# Patient Record
Sex: FEMALE | Race: BLACK OR AFRICAN AMERICAN
[De-identification: names, ages, dates, MRNs, and addresses within clinical notes are randomized per-mention and may not be internally consistent; named-entity substitution may affect disease eponyms.]

---

## 2023-03-02 ENCOUNTER — HOSPITAL ENCOUNTER (OUTPATIENT)
Facility: HOSPITAL | Age: 48
Setting detail: RECURRING SERIES
Discharge: HOME | End: 2023-03-05
Payer: COMMERCIAL

## 2023-03-02 PROCEDURE — 97535 SELF CARE MNGMENT TRAINING: CPT

## 2023-03-02 PROCEDURE — 97162 PT EVAL MOD COMPLEX 30 MIN: CPT
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
## 2023-03-02 NOTE — PROGRESS NOTES
PHYSICAL / OCCUPATIONAL THERAPY - DAILY TREATMENT NOTE (updated )  For Eval visit    Patient Name: Linwood Lassiter    Date: 3/2/2023    : 1975  Insurance: Payor: Valery Amato / Plan: Eli Lees / Product Type: *No Product type* /      Patient  verified yes     Visit #   Current / Total 1 24   Time   In / Out 12:00 12:43   Pain   In / Out 3 3   Subjective Functional Status/Changes: See POC   Changes to:  Meds, Allergies, Med Hx, Sx Hx? If yes, update Summary List no       TREATMENT AREA =  neck and back pain    OBJECTIVE       18 min   Eval - untimed                      Therapeutic Procedures: Tx Min Billable or 1:1 Min (if diff from Tx Min) Procedure, Rationale, Specifics   25  92861 Self Care/Home Management (timed):  improve patient knowledge and understanding of pain reducing techniques, positioning, posture/ergonomics, home safety, activity modification, diagnosis/prognosis, and physical therapy expectations, procedures and progression  to improve patient's ability to progress to PLOF and address remaining functional goals. (see flow sheet as applicable)     Details if applicable:  discussion about POC and rationale for exercise with benefits of increased O2; discussed goals and plans for getting into gym for independent care; discussed DA and need for physician signature after 30 days. 90391 Neuromuscular Re-Education (timed):  improve balance, coordination, kinesthetic sense, posture, core stability and proprioception to improve patient's ability to develop conscious control of individual muscles and awareness of position of extremities in order to progress to PLOF and address remaining functional goals. (see flow sheet as applicable)     Details if applicable:       46350 Therapeutic Exercise (timed):  increase ROM, strength, coordination, balance, and proprioception to improve patient's ability to progress to PLOF and address remaining functional goals.  (see flow sheet as applicable) Details if applicable:       69692 Therapeutic Activity (timed):  use of dynamic activities replicating functional movements to increase ROM, strength, coordination, balance, and proprioception in order to improve patient's ability to progress to PLOF and address remaining functional goals. (see flow sheet as applicable)     Details if applicable:     22  [facility] Totals Reminder: bill using total billable min of TIMED therapeutic procedures (example: do not include dry needle or estim unattended, both untimed codes, in totals to left)  8-22 min = 1 unit; 23-37 min = 2 units; 38-52 min = 3 units; 53-67 min = 4 units; 68-82 min = 5 units   Total Total     [x]  Patient Education billed concurrently with other procedures   [x] Review HEP    [] Progressed/Changed HEP, detail:    [] Other detail:       Objective Information/Functional Measures/Assessment    See POC    Patient will continue to benefit from skilled PT / OT services to modify and progress therapeutic interventions, analyze and address functional mobility deficits, analyze and address ROM deficits, analyze and address strength deficits, analyze and address soft tissue restrictions, analyze and cue for proper movement patterns, analyze and modify for postural abnormalities, and instruct in home and community integration to address functional deficits and attain remaining goals.     Progress toward goals / Updated goals:  [x]  See POC    See POC    PLAN  yes Continue plan of care  [x]  Upgrade activities as tolerated  []  Discharge due to :  []  Other:    Elisabeth Castellanos PT    3/2/2023    9:11 AM    Future Appointments   Date Time Provider Matthew Dash   3/2/2023 12:00 PM Elisabeth Castellanos, PT [address]

## 2023-03-06 ENCOUNTER — TELEPHONE (OUTPATIENT)
Facility: HOSPITAL | Age: 48
End: 2023-03-06

## 2023-03-08 ENCOUNTER — HOSPITAL ENCOUNTER (OUTPATIENT)
Facility: HOSPITAL | Age: 48
Setting detail: RECURRING SERIES
End: 2023-03-08
Payer: COMMERCIAL

## 2023-03-08 ENCOUNTER — TELEPHONE (OUTPATIENT)
Facility: HOSPITAL | Age: 48
End: 2023-03-08

## 2023-03-08 NOTE — TELEPHONE ENCOUNTER
<24hr cancel; patient states their daughter was exposed to Covid-19 and patient would like to cancel appt for today, stating she will get tested and call about keeping or not appt on 3/10/23.

## 2023-03-13 ENCOUNTER — HOSPITAL ENCOUNTER (OUTPATIENT)
Facility: HOSPITAL | Age: 48
Setting detail: RECURRING SERIES
Discharge: HOME | End: 2023-03-16
Payer: COMMERCIAL

## 2023-03-13 PROCEDURE — 97110 THERAPEUTIC EXERCISES: CPT

## 2023-03-13 PROCEDURE — 97140 MANUAL THERAPY 1/> REGIONS: CPT

## 2023-03-13 NOTE — PROGRESS NOTES
PHYSICAL / OCCUPATIONAL THERAPY - DAILY TREATMENT NOTE (updated )    Patient Name: Ward Poole    Date: 3/13/2023    : 1975  Insurance: Payor: LAURYN / Plan: Landy Cola / Product Type: *No Product type* /      Patient  verified Yes     Visit #   Current / Total 2 12   Time   In / Out 1035 1115   Pain   In / Out 4 2   Subjective Functional Status/Changes: Been going through a lot and I need to do some therapy for my body because i'm just in a lot of pain   Changes to:  Meds, Allergies, Med Hx, Sx Hx? If yes, update Summary List no       TREATMENT AREA =  Other low back pain [M54.59]    OBJECTIVE    Modalities Rationale:     decrease inflammation, decrease pain, and increase tissue extensibility to improve patient's ability to progress to PLOF and address remaining functional goals. min [] Estim Unattended, type/location:                                      []  w/ice    []  w/heat    min [] Estim Attended, type/location:                                     []  w/US     []  w/ice    []  w/heat    []  TENS insruct      min []  Mechanical Traction: type/lbs                   []  pro   []  sup   []  int   []  cont    []  before manual    []  after manual    min []  Ultrasound, settings/location:      min []  Iontophoresis w/ dexamethasone, location:                                               []  take home patch       []  in clinic    min  unbill []  Ice     []  Heat    location/position:     min []  Paraffin,  details:     min []  Vasopneumatic Device, press/temp:     min []  Elsie Hernandez / John Kang: If using vaso (only need to measure limb vaso being performed on)      pre-treatment girth :       post-treatment girth :       measured at (landmark location) :      min []  Other:    Skin assessment post-treatment (if applicable):    []  intact    []  redness- no adverse reaction                 []redness - adverse reaction:         Therapeutic Procedures:     Tx Min Billable or 1:1 Min (if diff from Tx Min) Procedure, Rationale, Specifics   15  28635 Manual Therapy (timed):  decrease pain, increase ROM, and increase tissue extensibility to improve patient's ability to progress to PLOF and address remaining functional goals. The manual therapy interventions were performed at a separate and distinct time from the therapeutic activities interventions . (see flow sheet as applicable)     Details if applicable:       25  48391 Therapeutic Exercise (timed):  increase ROM, strength, coordination, balance, and proprioception to improve patient's ability to progress to PLOF and address remaining functional goals. (see flow sheet as applicable)     Details if applicable:       72270 Neuromuscular Re-Education (timed):  improve balance, coordination, kinesthetic sense, posture, core stability and proprioception to improve patient's ability to develop conscious control of individual muscles and awareness of position of extremities in order to progress to PLOF and address remaining functional goals. (see flow sheet as applicable)     Details if applicable:       92254 Therapeutic Activity (timed):  use of dynamic activities replicating functional movements to increase ROM, strength, coordination, balance, and proprioception in order to improve patient's ability to progress to PLOF and address remaining functional goals.   (see flow sheet as applicable)     Details if applicable:            Details if applicable:     36  [facility] Totals Reminder: bill using total billable min of TIMED therapeutic procedures (example: do not include dry needle or estim unattended, both untimed codes, in totals to left)  8-22 min = 1 unit; 23-37 min = 2 units; 38-52 min = 3 units; 53-67 min = 4 units; 68-82 min = 5 units   Total Total     [x]  Patient Education billed concurrently with other procedures   [x] Review HEP    [] Progressed/Changed HEP, detail:    [] Other detail:       Objective Information/Functional Measures/Assessment    Overall good tolerance to all therapeutic interventions for first treatment session. No change in functional measurements today. Discussed treatment sessions to progress one body part per session for manual.  Performed all C/S this session and will address L/S next session. Patient will continue to benefit from skilled PT / OT services to modify and progress therapeutic interventions, analyze and address functional mobility deficits, analyze and address ROM deficits, analyze and address strength deficits, analyze and address soft tissue restrictions, and analyze and cue for proper movement patterns to address functional deficits and attain remaining goals. Progress toward goals / Updated goals:  []  See Progress Note/Recertification    Short Term Goals: To be accomplished in 3 weeks  Independent with HEP to progress to meet goals. 2. Pt to report 25% improvement in overall symptoms for improvement in ADLs. Long Term Goals: To be accomplished in 6 weeks  Improve FOTO score to 50/100 to show a significant functional change. 2. Pt to report decreased max pain levels less than 7/10 for improvement in QoL. 3. Pt to report going to the gym to perform routine for continued mental and physical care.     PLAN  Yes  Continue plan of care  [x]  Upgrade activities as tolerated  []  Discharge due to :  []  Other:    Lazara Ferris PTA    3/13/2023    6:39 AM    Future Appointments   Date Time Provider Matthew Dash   3/13/2023 10:30 AM Lazara Ferris PTA [facility]   3/15/2023  2:00 PM Lary Kay PT [facility]   3/17/2023 10:30 AM Lary Kay PT [facility]   3/22/2023 11:00 AM Lazara Ferris PTA [facility]   3/24/2023 11:00 AM Lary Kay, [address]   3/27/2023  9:30 AM Lazara Ferris PTA [facility]   3/29/2023  9:30 AM Lazara Ferris PTA [facility]   3/31/2023  9:30 AM Lazara Ferris PTA [facility]   4/3/2023  9:30 AM Lazara Ferris [facility]   4/5/2023  9:30 AM Erlinda Gonzalez KRISHAN Corley [facility]   4/10/2023  9:30 AM John Corley PTA [facility]   4/12/2023  9:30 AM KRISHAN Gutierrez [facility]

## 2023-03-15 ENCOUNTER — HOSPITAL ENCOUNTER (OUTPATIENT)
Facility: HOSPITAL | Age: 48
Setting detail: RECURRING SERIES
Discharge: HOME | End: 2023-03-18
Payer: COMMERCIAL

## 2023-03-15 PROCEDURE — 97110 THERAPEUTIC EXERCISES: CPT

## 2023-03-15 PROCEDURE — 97530 THERAPEUTIC ACTIVITIES: CPT

## 2023-03-15 PROCEDURE — G0283 ELEC STIM OTHER THAN WOUND: HCPCS

## 2023-03-15 NOTE — PROGRESS NOTES
PHYSICAL / OCCUPATIONAL THERAPY - DAILY TREATMENT NOTE (updated )    Patient Name: Karri Livingston    Date: 3/15/2023    : 1975  Insurance: Payor: LAURYN / Plan: [facility] [employer] / Product Type: *No Product type* /      Patient  verified Yes     Visit #   Current / Total 3 12   Time   In / Out 2:03 2:52   Pain   In / Out 3 1   Subjective Functional Status/Changes: Reports she was a little sore after last session but felt pretty good. Changes to:  Meds, Allergies, Med Hx, Sx Hx? If yes, update Summary List no       TREATMENT AREA =  Other low back pain [M54.59]    OBJECTIVE    Modalities Rationale:     decrease inflammation, decrease pain, and increase tissue extensibility to improve patient's ability to progress to PLOF and address remaining functional goals. 10 min [] Estim Unattended, type/location: Low back in supine                                     [x]  w/ice    []  w/heat    min [] Estim Attended, type/location:                                     []  w/US     []  w/ice    []  w/heat    []  TENS insruct      min []  Mechanical Traction: type/lbs                   []  pro   []  sup   []  int   []  cont    []  before manual    []  after manual    min []  Ultrasound, settings/location:      min []  Iontophoresis w/ dexamethasone, location:                                               []  take home patch       []  in clinic    min  unbill []  Ice     []  Heat    location/position:     min []  Paraffin,  details:     min []  Vasopneumatic Device, press/temp:     min []  Harpreet Peaches / Manette Fairly: If using vaso (only need to measure limb vaso being performed on)      pre-treatment girth :       post-treatment girth :       measured at (landmark location) :      min []  Other:    Skin assessment post-treatment (if applicable):    [x]  intact    []  redness- no adverse reaction                 []redness - adverse reaction:         Therapeutic Procedures:     Tx Min Billable or 1:1 Min (if diff from Tx Min) Procedure, Rationale, Specifics     63768 Manual Therapy (timed):  decrease pain, increase ROM, and increase tissue extensibility to improve patient's ability to progress to PLOF and address remaining functional goals. The manual therapy interventions were performed at a separate and distinct time from the therapeutic activities interventions . (see flow sheet as applicable)     Details if applicable:       10  94935 Therapeutic Exercise (timed):  increase ROM, strength, coordination, balance, and proprioception to improve patient's ability to progress to PLOF and address remaining functional goals. (see flow sheet as applicable)     Details if applicable:       65044 Neuromuscular Re-Education (timed):  improve balance, coordination, kinesthetic sense, posture, core stability and proprioception to improve patient's ability to develop conscious control of individual muscles and awareness of position of extremities in order to progress to PLOF and address remaining functional goals. (see flow sheet as applicable)     Details if applicable:     29  83907 Therapeutic Activity (timed):  use of dynamic activities replicating functional movements to increase ROM, strength, coordination, balance, and proprioception in order to improve patient's ability to progress to PLOF and address remaining functional goals.   (see flow sheet as applicable)     Details if applicable:            Details if applicable:     44  [facility] Totals Reminder: bill using total billable min of TIMED therapeutic procedures (example: do not include dry needle or estim unattended, both untimed codes, in totals to left)  8-22 min = 1 unit; 23-37 min = 2 units; 38-52 min = 3 units; 53-67 min = 4 units; 68-82 min = 5 units   Total Total     [x]  Patient Education billed concurrently with other procedures   [x] Review HEP    [] Progressed/Changed HEP, detail:    [] Other detail:       Objective Information/Functional Measures/Assessment    Good effort with session. Initiated session with dynamic warm up to include: knee hugs, quad pulls, hip opener, hamstring stretch and toe/heel walking. Noted some pain in low back with squats however less with decreased depth. Good form with RDL. Required 100% cueing for all therex due to first time attempting. Patient will continue to benefit from skilled PT / OT services to modify and progress therapeutic interventions, analyze and address functional mobility deficits, analyze and address ROM deficits, analyze and address strength deficits, analyze and address soft tissue restrictions, and analyze and cue for proper movement patterns to address functional deficits and attain remaining goals. Progress toward goals / Updated goals:  []  See Progress Note/Recertification    Short Term Goals: To be accomplished in 3 weeks  Independent with HEP to progress to meet goals. Will give HEP next week 3/15/23  2. Pt to report 25% improvement in overall symptoms for improvement in ADLs. Long Term Goals: To be accomplished in 6 weeks  Improve FOTO score to 50/100 to show a significant functional change. 2. Pt to report decreased max pain levels less than 7/10 for improvement in QoL. 3. Pt to report going to the gym to perform routine for continued mental and physical care.     PLAN  Yes  Continue plan of care  [x]  Upgrade activities as tolerated  []  Discharge due to :  []  Other:    Darien Jordan PT    3/15/2023    8:56 AM    Future Appointments   Date Time Provider Matthew Dash   3/15/2023  2:00 PM Darien Jordan PT [facility] [address]   3/17/2023 10:30 AM Darien Jordan PT [facility] [address]   3/22/2023 11:00 AM Nils Phillips PTA [facility] [address]   3/24/2023 11:00 AM Darien Jordan PT [address]   3/27/2023  9:30 AM Nils Phillips PTA [facility] [address]   3/29/2023  9:30 AM Nilsephraim Phillips PTA [address]   3/31/2023  9:30 AM Nilsephraim Phillips, KRISHAN [facility] [address]   4/3/2023  9:30 AM Nilsephraim Phillips, PTA [address]   4/5/2023  9:30 AM Nils Phillips, KRISHAN [address] 4/10/2023  9:30 AM Arabella Villarreal PTA [facility]   4/12/2023  9:30 AM KRISHAN [address]

## 2023-03-17 ENCOUNTER — HOSPITAL ENCOUNTER (OUTPATIENT)
Facility: HOSPITAL | Age: 48
Setting detail: RECURRING SERIES
Discharge: HOME | End: 2023-03-20
Payer: COMMERCIAL

## 2023-03-17 PROCEDURE — 97112 NEUROMUSCULAR REEDUCATION: CPT

## 2023-03-17 PROCEDURE — 97110 THERAPEUTIC EXERCISES: CPT

## 2023-03-17 PROCEDURE — 97530 THERAPEUTIC ACTIVITIES: CPT

## 2023-03-22 ENCOUNTER — HOSPITAL ENCOUNTER (OUTPATIENT)
Facility: HOSPITAL | Age: 48
Setting detail: RECURRING SERIES
Discharge: HOME | End: 2023-03-25
Payer: COMMERCIAL

## 2023-03-22 PROCEDURE — 97112 NEUROMUSCULAR REEDUCATION: CPT

## 2023-03-22 PROCEDURE — 97110 THERAPEUTIC EXERCISES: CPT

## 2023-03-22 NOTE — PROGRESS NOTES
PHYSICAL / OCCUPATIONAL THERAPY - DAILY TREATMENT NOTE (updated )    Patient Name: Dinora Graves    Date: 3/22/2023    : 1975  Insurance: Payor: [facility] / Plan: Juliet Jameson / Product Type: *No Product type* /      Patient  verified Yes     Visit #   Current / Total 5 12   Time   In / Out 1105 1140   Pain   In / Out 7 5   Subjective Functional Status/Changes: I fell on Friday night and really hurt myself. I got up early morning to use the rest room and I guess I had fallen asleep while walking to the rest room and fell. Changes to:  Meds, Allergies, Med Hx, Sx Hx? If yes, update Summary List no       TREATMENT AREA =  Other low back pain [M54.59]    OBJECTIVE    Modalities Rationale:     decrease inflammation, decrease pain, and increase tissue extensibility to improve patient's ability to progress to PLOF and address remaining functional goals. min [] Estim Unattended, type/location:                                      []  w/ice    []  w/heat    min [] Estim Attended, type/location:                                     []  w/US     []  w/ice    []  w/heat    []  TENS insruct      min []  Mechanical Traction: type/lbs                   []  pro   []  sup   []  int   []  cont    []  before manual    []  after manual    min []  Ultrasound, settings/location:      min []  Iontophoresis w/ dexamethasone, location:                                               []  take home patch       []  in clinic    min  unbill []  Ice     []  Heat    location/position:     min []  Paraffin,  details:     min []  Vasopneumatic Device, press/temp:     min []  Vianey Quinonez / Alecia Priestly:     If using vaso (only need to measure limb vaso being performed on)      pre-treatment girth :       post-treatment girth :       measured at (landmark location) :      min []  Other:    Skin assessment post-treatment (if applicable):    []  intact    []  redness- no adverse reaction                 []redness - adverse reaction:

## 2023-03-24 ENCOUNTER — TELEPHONE (OUTPATIENT)
Facility: HOSPITAL | Age: 48
End: 2023-03-24

## 2023-03-24 ENCOUNTER — HOSPITAL ENCOUNTER (OUTPATIENT)
Facility: HOSPITAL | Age: 48
Setting detail: RECURRING SERIES
End: 2023-03-24
Payer: COMMERCIAL

## 2023-03-29 ENCOUNTER — HOSPITAL ENCOUNTER (OUTPATIENT)
Facility: HOSPITAL | Age: 48
Setting detail: RECURRING SERIES
Discharge: HOME | End: 2023-04-01
Payer: COMMERCIAL

## 2023-03-29 PROCEDURE — 97112 NEUROMUSCULAR REEDUCATION: CPT

## 2023-03-29 PROCEDURE — 97110 THERAPEUTIC EXERCISES: CPT

## 2023-03-29 NOTE — PROGRESS NOTES
Measures/Assessment    Increase core stability and strengthening therx as well as scap stability therx. All performed well and without increase of symptoms. Patient will continue to benefit from skilled PT / OT services to modify and progress therapeutic interventions, analyze and address functional mobility deficits, analyze and address ROM deficits, analyze and address strength deficits, analyze and address soft tissue restrictions, and analyze and cue for proper movement patterns to address functional deficits and attain remaining goals. Progress toward goals / Updated goals:  []  See Progress Note/Recertification    Short Term Goals: To be accomplished in 3 weeks  Independent with HEP to progress to meet goals. 2. Pt to report 25% improvement in overall symptoms for improvement in ADLs. Long Term Goals: To be accomplished in 6 weeks  Improve FOTO score to 50/100 to show a significant functional change. 2. Pt to report decreased max pain levels less than 7/10 for improvement in QoL. 3. Pt to report going to the gym to perform routine for continued mental and physical care.     PLAN  Yes  Continue plan of care  [x]  Upgrade activities as tolerated  []  Discharge due to :  []  Other:    Cory Matthew PTA    3/29/2023    6:15 AM    Future Appointments   Date Time Provider Matthew Dash   3/29/2023  9:30 AM Cory Matthew PTA [facility]   3/31/2023  9:30 AM Cory Matthew PTA [address]   4/3/2023  9:30 AM Cory Matthew PTA [address]   4/5/2023  9:30 AM Cory Matthew PTA [address]   4/10/2023  9:30 AM Cory Matthew PTA [address]   4/12/2023  9:30 AM Cory Matthew PTA [address]

## 2023-03-31 ENCOUNTER — HOSPITAL ENCOUNTER (OUTPATIENT)
Facility: HOSPITAL | Age: 48
Setting detail: RECURRING SERIES
End: 2023-03-31
Payer: COMMERCIAL

## 2023-03-31 PROCEDURE — 97112 NEUROMUSCULAR REEDUCATION: CPT

## 2023-03-31 PROCEDURE — 97535 SELF CARE MNGMENT TRAINING: CPT

## 2023-03-31 PROCEDURE — 97110 THERAPEUTIC EXERCISES: CPT

## 2023-03-31 NOTE — PROGRESS NOTES
diff from Tx Min) Procedure, Rationale, Specifics     42471 Manual Therapy (timed):  decrease pain, increase ROM, and increase tissue extensibility to improve patient's ability to progress to PLOF and address remaining functional goals. The manual therapy interventions were performed at a separate and distinct time from the therapeutic activities interventions . (see flow sheet as applicable)     Details if applicable:       25  71910 Therapeutic Exercise (timed):  increase ROM, strength, coordination, balance, and proprioception to improve patient's ability to progress to PLOF and address remaining functional goals. (see flow sheet as applicable)     Details if applicable:     15  05536 Neuromuscular Re-Education (timed):  improve balance, coordination, kinesthetic sense, posture, core stability and proprioception to improve patient's ability to develop conscious control of individual muscles and awareness of position of extremities in order to progress to PLOF and address remaining functional goals. (see flow sheet as applicable)     Details if applicable:       16159 Therapeutic Activity (timed):  use of dynamic activities replicating functional movements to increase ROM, strength, coordination, balance, and proprioception in order to improve patient's ability to progress to PLOF and address remaining functional goals. (see flow sheet as applicable)     Details if applicable:     15   73053 Self Care/Home Management (timed):  improve patient knowledge and understanding of assess goals and function  to improve patient's ability to progress to PLOF and address remaining functional goals.   (see flow sheet as applicable)      Details if applicable:     54  [address] [facility] [address] Reminder: bill using total billable min of TIMED therapeutic procedures (example: do not include dry needle or estim unattended, both untimed codes, in totals to left)  8-22 min = 1 unit; 23-37 min = 2 units; 38-52 min = 3 units; 53-67 min = 4 units; units; 68-82 min = 5 units   Total Total     [x]  Patient Education billed concurrently with other procedures   [x] Review HEP    [] Progressed/Changed HEP, detail:    [] Other detail:       Objective Information/Functional Measures/Assessment    GROC: +2 a little better     FOTO: 53     Patient reports max pain 5/10, least pain 3/10, average pain 5/10 with stiffness in the morning. Patient reports ~ 50% overall improvement with performance of all general ADLs and functional mobility activities. Patient reports ~ 0% reduction of symptoms. Patient reports sitting tolerance: 30 minutes, standing tolerance: 20 minutes, walking tolerance: 20 minutes. Patient will continue to benefit from skilled PT / OT services to modify and progress therapeutic interventions, analyze and address functional mobility deficits, analyze and address ROM deficits, analyze and address strength deficits, analyze and address soft tissue restrictions, and analyze and cue for proper movement patterns to address functional deficits and attain remaining goals. Progress toward goals / Updated goals:  []  See Progress Note/Recertification    Short Term Goals: To be accomplished in 3 weeks  Independent with HEP to progress to meet goals. 2. Pt to report 25% improvement in overall symptoms for improvement in ADLs. Long Term Goals: To be accomplished in 6 weeks  Improve FOTO score to 50/100 to show a significant functional change. 2. Pt to report decreased max pain levels less than 7/10 for improvement in QoL. 3. Pt to report going to the gym to perform routine for continued mental and physical care.     PLAN  Yes  Continue plan of care  [x]  Upgrade activities as tolerated  []  Discharge due to :  []  Other:    Tom Arce PTA    3/31/2023    6:22 AM    Future Appointments   Date Time Provider Matthew Dash   3/31/2023  9:30 AM Tom Arce PTA [facility]   4/3/2023  9:30 AM Tom Arce PTA [address]   4/5/2023

## 2023-04-03 ENCOUNTER — TELEPHONE (OUTPATIENT)
Facility: HOSPITAL | Age: 48
End: 2023-04-03

## 2023-04-03 NOTE — THERAPY RECERTIFICATION
[facility] THERAPY  [address],[place], [address] [facility] - Ph: [phone]  Fx: [phone]  PHYSICAL THERAPY PROGRESS NOTE  Patient Name: Chalino Odell : 1975   Treatment/Medical Diagnosis: Other low back pain [M54.59]   Referral Source: Referral, Self     Date of Initial Visit: 3/2/2023 Attended Visits: 7 Missed Visits: 4     SUMMARY OF TREATMENT    Pt seen in clinic for to address Other low back pain [M54.59]. Pt has been assessed, completed therapeutic exercises, neuromuscular re-ed, therapeutic functional activity, received manual therapy intervention, self-care strategies, HEP techniques and pt ed on condition consistency and follow-through. CURRENT STATUS    Therapy has been inconsistent since initial eval and has missed 4 treatment session. Patient has been advised of our NS/CXL policy. Overall patient has had good tolerance to all therapeutic interventions. GROC: +2 a little better  Patient reports ~ 50% overall improvement with performance of all general ADLs and functional mobility activities. Patient reports ~ 0% reduction of symptoms. Patient reports sitting tolerance: 30 minutes, standing tolerance: 20 minutes, walking tolerance: 20 minutes. Improve FOTO score to 50/100 to show a significant functional change. Status at last Eval: 44  Current Status: 53  Goal Met?  yes    2. Pt to report decreased max pain levels less than 7/10 for improvement in QoL. Status at last Eval: Pt rates pain as 10/10 max, 3/10 at best, 3/10 currently  Current Status: Patient reports max pain 5/10, least pain 3/10, average pain 5/10 with stiffness in the morning. Goal Met?  yes    3. Pt to report going to the gym to perform routine for continued mental and physical care.   Status at last Eval: na  Current Status: not at this time  Goal Met?  no    Payor: LAURYN / Plan: [facility] / Product Type: *No Product type* /     Non-Medicare,

## 2023-04-03 NOTE — TELEPHONE ENCOUNTER
patient's family member/caregiver called stating the patient is on a medication that makes her unable to drive so they cannot make appt today.

## 2023-04-04 ENCOUNTER — TELEPHONE (OUTPATIENT)
Facility: HOSPITAL | Age: 48
End: 2023-04-04